# Patient Record
Sex: FEMALE | Race: WHITE | NOT HISPANIC OR LATINO | Employment: UNEMPLOYED | URBAN - METROPOLITAN AREA
[De-identification: names, ages, dates, MRNs, and addresses within clinical notes are randomized per-mention and may not be internally consistent; named-entity substitution may affect disease eponyms.]

---

## 2023-01-10 ENCOUNTER — OFFICE VISIT (OUTPATIENT)
Age: 43
End: 2023-01-10

## 2023-01-10 VITALS
DIASTOLIC BLOOD PRESSURE: 88 MMHG | HEART RATE: 88 BPM | BODY MASS INDEX: 29.23 KG/M2 | WEIGHT: 165 LBS | HEIGHT: 63 IN | TEMPERATURE: 97 F | RESPIRATION RATE: 19 BRPM | OXYGEN SATURATION: 99 % | SYSTOLIC BLOOD PRESSURE: 129 MMHG

## 2023-01-10 DIAGNOSIS — L03.011 PARONYCHIA OF RIGHT MIDDLE FINGER: Primary | ICD-10-CM

## 2023-01-10 RX ORDER — AMOXICILLIN AND CLAVULANATE POTASSIUM 875; 125 MG/1; MG/1
1 TABLET, FILM COATED ORAL EVERY 12 HOURS SCHEDULED
Qty: 20 TABLET | Refills: 0 | Status: SHIPPED | OUTPATIENT
Start: 2023-01-10 | End: 2023-01-20

## 2023-01-10 NOTE — PROGRESS NOTES
[facility]        NAME: Dianna Feliciano is a 40 y o  female  : 1980    MRN: [id number]  DATE: 2023  TIME: 9:51 AM    Assessment and Plan   Paronychia of right middle finger [L03 011]  1  Paronychia of right middle finger  amoxicillin-clavulanate (AUGMENTIN) 875-125 mg per tablet    Wound culture and Gram stain    Incision and drain    sulfamethoxazole-trimethoprim (BACTRIM DS) 800-160 mg per tablet            Patient Instructions       Follow up with PCP  Finish antibiotic  Warm soaks  I explained to her that we took a culture and if there is any different antibiotic needed we will let her know  Otherwise keep the wound covered  Culture came back and some bacteria were sensitive to the Augmentin and others were resistant  The bacteria was not sensitive to just 1 antibiotic  Discussed with patient and Bactrim added  Chief Complaint     Chief Complaint   Patient presents with   • Finger Swelling     Pt reports R third digit swelling and redness for the past week  Denies any trauma to digit, states this seems to happen often to her finger nails as it has happened in the past with 2 other nails  History of Present Illness       Patient works on the farm and milks cows  Has had difficulty with infections in her hands in the past   Over the last week she has had increasing right long finger swelling redness and throbbing pain  No drainage  She did try to open it with a needle but was unsuccessful  No other complaints  Review of Systems   Review of Systems   All other systems reviewed and are negative          Current Medications       Current Outpatient Medications:   •  amoxicillin-clavulanate (AUGMENTIN) 875-125 mg per tablet, Take 1 tablet by mouth every 12 (twelve) hours for 10 days, Disp: 20 tablet, Rfl: 0  •  sulfamethoxazole-trimethoprim (BACTRIM DS) 800-160 mg per tablet, Take 1 tablet by mouth every 12 (twelve) hours for 10 days, Disp: 20 tablet, Rfl: 0    Current Allergies     Allergies as of 01/10/2023   • (No Known Allergies)            The following portions of the patient's history were reviewed and updated as appropriate: allergies, current medications, past family history, past medical history, past social history, past surgical history and problem list      Past Medical History:   Diagnosis Date   • Rheumatoid arthritis ([address] )        Past Surgical History:   Procedure Laterality Date   • APPENDECTOMY     •  SECTION         History reviewed  No pertinent family history  Medications have been verified  Objective   /88   Pulse 88   Temp (!) 97 °F (36 1 °C)   Resp 19   Ht 5' 3" (1 6 m)   Wt 74 8 kg (165 lb)   LMP 2023   SpO2 99%   BMI 29 23 kg/m²   Patient's last menstrual period was 2023  Physical Exam     Physical Exam  Vitals and nursing note reviewed  Constitutional:       Appearance: Normal appearance  She is normal weight  Cardiovascular:      Rate and Rhythm: Normal rate and regular rhythm  Pulses: Normal pulses  Heart sounds: Normal heart sounds  Pulmonary:      Effort: Pulmonary effort is normal       Breath sounds: Normal breath sounds  Neurological:      Mental Status: She is alert  Right middle finger paronychia  Incision and drain    Date/Time: 1/10/2023 11:21 AM  Performed by: Mya Hernandez PA-C  Authorized by: Mya Hernandez PA-C   Universal Protocol:  Consent: Verbal consent obtained  Risks and benefits: risks, benefits and alternatives were discussed  Consent given by: patient  Time out: Immediately prior to procedure a "time out" was called to verify the correct patient, procedure, equipment, support staff and site/side marked as required    Timeout called at: 1/10/2023 11:21 AM   Patient understanding: patient states understanding of the procedure being performed  Patient consent: the patient's understanding of the procedure matches consent given  Patient identity confirmed: verbally with patient      Patient location:  Clinic  Location:     Type:  Abscess    Location:  Upper extremity    Upper extremity location:  R long finger  Pre-procedure details:     Skin preparation:  Antiseptic wash  Anesthesia (see MAR for exact dosages): Anesthesia method:  None  Procedure details:     Complexity:  Simple    Incision types:  Stab incision    Incision depth:  Subcutaneous    Wound management:  Probed and deloculated    Drainage:  Purulent    Drainage amount:  Copious    Wound treatment:  Wound left open    Packing materials:  None  Post-procedure details:     Patient tolerance of procedure:   Tolerated well, no immediate complications

## 2023-01-12 RX ORDER — SULFAMETHOXAZOLE AND TRIMETHOPRIM 800; 160 MG/1; MG/1
1 TABLET ORAL EVERY 12 HOURS SCHEDULED
Qty: 20 TABLET | Refills: 0 | Status: SHIPPED | OUTPATIENT
Start: 2023-01-12 | End: 2023-01-22

## 2023-01-13 LAB
BACTERIA WND AEROBE CULT: ABNORMAL
GRAM STN SPEC: ABNORMAL
GRAM STN SPEC: ABNORMAL

## 2024-05-17 NOTE — PROGRESS NOTES
PT Evaluation     Today's date: 2024  Patient name: Deisi Hanks  : 1980  MRN: [id number]  Referring provider: Turner Hunter MD  Dx:   Encounter Diagnosis     ICD-10-CM    1. Rotator cuff tendinitis, left  M75.82       2. Acute pain of left shoulder  M25.512           Start Time: 0900  Stop Time: 0950  Total time in clinic (min): 50 minutes    Assessment  Impairments: abnormal or restricted ROM, activity intolerance, impaired physical strength, lacks appropriate home exercise program, poor posture  and poor body mechanics    Assessment details: 44 yo female referred to PT with dx of  L shld RTC tendonitis.  Pt presents with 1-5/10 pain scale-with pain mostly L upper arm/superior shld.  Physical exam reveals limited passive L shld IR w/ pain along w/ pain/weakness w/ L shld ER and abd.  Pt exhibits forward head, IR shld w/ mild thoracic kyphsis and +Ordonez and Neer tests on L. Pt demonstrates and/or c/o difficulty with tasks involving lifting w/ outstretched arm or reaching behind the back.  Pt is below her prior functional level due to the above limitations. she would benefit from PT intervention in order to address the above deficits so that she may resume her daily activities at home, work, and in the community safely with less pain and limitation at her premorbid  intensity and duration .     Goals  Pt will achieve the following goals in the next 2-4 weeks  STG 24  Indep with current HEP  Dec pain by 1-3 levels  Improve L shld IR AROM by 5-10 deg  Improve L shld strength by 1/2 grade  Initiate posture, joint conservation and body mechanics education  Consistent PT attendance BIW or as scheduled      Pt will achieve the following goals by d/c (8-12 weeks, or as stated in plan)  LTG  indep and compliant with HEP in order to maximize gains achieved in therapy  0-2/10 pain scale in order to feel better and decrease/eliminate use of  pain &/or anti-inflammatory  meds  L shld/UE  ROM WFL and   strength 4-5/5 in order to improve functional mobility   Exhibit carryover of proper posture, joint conservation techniques, and body mechanics in order to dec unnecessary muscle strain and preserve joint integrity  Improve  FOTO score to 74 or more to indicate inc functional ability of patient   Resume activities,including sleep and tasks involving lifting w/ LUE, safely with less pain and limitation at her premorbid  intensity and duration    Plan  Patient would benefit from: skilled physical therapy  Planned modality interventions: cryotherapy    Planned therapy interventions: neuromuscular re-education, nerve gliding, patient/caregiver education, postural training, body mechanics training, therapeutic activities, stretching, strengthening, therapeutic exercise, flexibility and home exercise program    Frequency: 1-2x week  Duration in weeks: 8  Plan of Care beginning date: 2024  Plan of Care expiration date: 2024  Treatment plan discussed with: patient        Subjective Evaluation    History of Present Illness  Mechanism of injury: 24 Eval-pt c/o B upper shld/cerv-thor soreness x years. But ~ 2 weeks ago began w/ L shld/upper arm pain-worse when inactive/trying to sleep.  Can awaken pt during the night-L s/l not nia well.  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  General upper thoracic/lower cerv soft tissue tightness/tenderness.  Tender B lat scap w/ soft tissue restriction    Active Range of Motion   Left Shoulder   Flexion: 155 degrees with pain  Abduction: 155 degrees with pain  External rotation BTH: C7   Internal rotation BTB: T12 with pain    Right Shoulder   Flexion: 155 degrees   Abduction: 155 degrees   External rotation BTH: C7   Internal rotation BTB: T12     Left Elbow   Normal active range of motion    Right Elbow   Normal active range of motion    Left Wrist   Normal active range of motion    Right Wrist   Normal active range of motion    Passive Range of Motion   Left Shoulder   External rotation 45°: 80 degrees   Internal rotation 45°: 40 degrees with pain    Right Shoulder   External rotation 45°: 80 degrees   Internal rotation 45°: 80 degrees     Strength/Myotome Testing     Left Shoulder     Planes of Motion   Flexion: 4   Abduction: 4 (pain)   External rotation at 0°: 4 (pain)   Internal rotation at 0°: 5     Right Shoulder     Planes of Motion   Flexion: 4   Abduction: 4   External rotation at 0°: 4   Internal rotation at 0°: 5     Left Elbow   Normal strength    Right Elbow   Normal strength    Left Wrist/Hand   Normal wrist strength    Right Wrist/Hand   Normal wrist strength    Tests     Left Shoulder   Positive empty can, Hawkin's and Speed's.   Negative Neer's.     Right Shoulder   Negative empty can, Hawkin's, Neer's and Speed's.       Flowsheet Rows      Flowsheet Row Most Recent Value   PT/OT G-Codes    Current Score 68   Projected Score 74   Assessment Type Evaluation               Precautions: none    SELF PAY/1 TE   5/23/24            Manuals #1     "        PROM/MOB L shld AE                                                   Neuro Re-Ed                                                                                                        Ther Ex             B shld TB  -lat pull down -            L shld TB-6 planes -            Biceps curls  Triceps/paillift -            pendulum -            Scap ret/shrugs X10 ea            Cerv retraction  X10 (against wall)            S/l sleeper stretch (L 3x15\"                                       Ther Activity                                       Gait Training                                       Modalities                                            "

## 2024-05-23 ENCOUNTER — EVALUATION (OUTPATIENT)
Age: 44
End: 2024-05-23

## 2024-05-23 DIAGNOSIS — M25.512 ACUTE PAIN OF LEFT SHOULDER: ICD-10-CM

## 2024-05-23 DIAGNOSIS — M75.82 ROTATOR CUFF TENDINITIS, LEFT: Primary | ICD-10-CM

## 2024-05-23 PROCEDURE — 97161 PT EVAL LOW COMPLEX 20 MIN: CPT

## 2024-05-23 RX ORDER — TOCILIZUMAB 180 MG/ML
INJECTION, SOLUTION SUBCUTANEOUS WEEKLY
COMMUNITY

## 2024-05-23 NOTE — LETTER
May 24, 2024    Turner Hunter MD  [facility].  [place]     Patient: Deisi Hanks   YOB: 1980   Date of Visit: 2024     Encounter Diagnosis     ICD-10-CM    1. Rotator cuff tendinitis, left  M75.82       2. Acute pain of left shoulder  M25.512           Dear Dr. Hunter:    Thank you for your recent referral of Deisi Hanks. Please review the attached evaluation summary from Deisi's recent visit.     Please verify that you agree with the plan of care by signing the attached order.     If you have any questions or concerns, please do not hesitate to call.     I sincerely appreciate the opportunity to share in the care of one of your patients and hope to have another opportunity to work with you in the near future.       Sincerely,    Vee Guillaume, PT      Referring Provider:      I certify that I have read the below Plan of Care and certify the need for these services furnished under this plan of treatment while under my care.                    Turner Hunter MD  [facility].  [place]   Via Fax: [phone]          PT Evaluation     Today's date: 2024  Patient name: Deisi Hanks  : 1980  MRN: [id number]  Referring provider: Turner Hunter MD  Dx:   Encounter Diagnosis     ICD-10-CM    1. Rotator cuff tendinitis, left  M75.82       2. Acute pain of left shoulder  M25.512           Start Time: 0900  Stop Time: 0950  Total time in clinic (min): 50 minutes    Assessment  Impairments: abnormal or restricted ROM, activity intolerance, impaired physical strength, lacks appropriate home exercise program, poor posture  and poor body mechanics    Assessment details: 44 yo female referred to PT with dx of  L shld RTC tendonitis.  Pt presents with 1-5/10 pain scale-with pain mostly L upper arm/superior shld.  Physical exam reveals limited passive L shld IR w/ pain along w/ pain/weakness w/ L shld ER and abd.  Pt exhibits forward head, IR  shld w/ mild thoracic kyphsis and +Ordonez and Neer tests on L. Pt demonstrates and/or c/o difficulty with tasks involving lifting w/ outstretched arm or reaching behind the back.  Pt is below her prior functional level due to the above limitations. she would benefit from PT intervention in order to address the above deficits so that she may resume her daily activities at home, work, and in the community safely with less pain and limitation at her premorbid  intensity and duration .     Goals  Pt will achieve the following goals in the next 2-4 weeks  STG 5/23/24  Indep with current HEP  Dec pain by 1-3 levels  Improve L shld IR AROM by 5-10 deg  Improve L shld strength by 1/2 grade  Initiate posture, joint conservation and body mechanics education  Consistent PT attendance BIW or as scheduled      Pt will achieve the following goals by d/c (8-12 weeks, or as stated in plan)  LTG  indep and compliant with HEP in order to maximize gains achieved in therapy  0-2/10 pain scale in order to feel better and decrease/eliminate use of  pain &/or anti-inflammatory  meds  L shld/UE  ROM WFL and  strength 4-5/5 in order to improve functional mobility   Exhibit carryover of proper posture, joint conservation techniques, and body mechanics in order to dec unnecessary muscle strain and preserve joint integrity  Improve  FOTO score to 74 or more to indicate inc functional ability of patient   Resume activities,including sleep and tasks involving lifting w/ LUE, safely with less pain and limitation at her premorbid  intensity and duration    Plan  Patient would benefit from: skilled physical therapy  Planned modality interventions: cryotherapy    Planned therapy interventions: neuromuscular re-education, nerve gliding, patient/caregiver education, postural training, body mechanics training, therapeutic activities, stretching, strengthening, therapeutic exercise, flexibility and home exercise program    Frequency: 1-2x  week  Duration in weeks: 8  Plan of Care beginning date: 2024  Plan of Care expiration date: 2024  Treatment plan discussed with: patient        Subjective Evaluation    History of Present Illness  Mechanism of injury: 24 Eval-pt c/o B upper shld/cerv-thor soreness x years. But ~ 2 weeks ago began w/ L shld/upper arm pain-worse when inactive/trying to sleep.  Can awaken pt during the night-L s/l not nia well.  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  General upper thoracic/lower cerv soft tissue tightness/tenderness.  Tender B lat scap w/ soft tissue restriction    Active Range of Motion   Left Shoulder   Flexion: 155 degrees with pain  Abduction: 155 degrees with pain  External rotation BTH: C7   Internal rotation BTB: T12 with pain    Right Shoulder   Flexion: 155 degrees   Abduction: 155 degrees   External rotation BTH: C7   Internal rotation BTB: T12     Left Elbow   Normal active range of motion    Right Elbow   Normal active range of motion    Left Wrist   Normal active range of  "motion    Right Wrist   Normal active range of motion    Passive Range of Motion   Left Shoulder   External rotation 45°: 80 degrees   Internal rotation 45°: 40 degrees with pain    Right Shoulder   External rotation 45°: 80 degrees   Internal rotation 45°: 80 degrees     Strength/Myotome Testing     Left Shoulder     Planes of Motion   Flexion: 4   Abduction: 4 (pain)   External rotation at 0°: 4 (pain)   Internal rotation at 0°: 5     Right Shoulder     Planes of Motion   Flexion: 4   Abduction: 4   External rotation at 0°: 4   Internal rotation at 0°: 5     Left Elbow   Normal strength    Right Elbow   Normal strength    Left Wrist/Hand   Normal wrist strength    Right Wrist/Hand   Normal wrist strength    Tests     Left Shoulder   Positive empty can, Hawkin's and Speed's.   Negative Neer's.     Right Shoulder   Negative empty can, Hawkin's, Neer's and Speed's.       Flowsheet Rows      Flowsheet Row Most Recent Value   PT/OT G-Codes    Current Score 68   Projected Score 74   Assessment Type Evaluation               Precautions: none    SELF PAY/1 TE   5/23/24            Manuals #1            PROM/MOB L shld AE                                                   Neuro Re-Ed                                                                                                        Ther Ex             B shld TB  -lat pull down -            L shld TB-6 planes -            Biceps curls  Triceps/paillift -            pendulum -            Scap ret/shrugs X10 ea            Cerv retraction  X10 (against wall)            S/l sleeper stretch (L 3x15\"                                       Ther Activity                                       Gait Training                                       Modalities                                                            "

## 2024-05-23 NOTE — LETTER
May 23, 2024    Turner Hunter MD  [address] [facility].  [place]     Patient: Deisi Hanks   YOB: 1980   Date of Visit: 2024     Encounter Diagnosis     ICD-10-CM    1. Rotator cuff tendinitis, left  M75.82       2. Acute pain of left shoulder  M25.512           Dear Dr. Hunter:    Thank you for your recent referral of Deisi Hanks. Please review the attached evaluation summary from Deisi's recent visit.     Please verify that you agree with the plan of care by signing the attached order.     If you have any questions or concerns, please do not hesitate to call.     I sincerely appreciate the opportunity to share in the care of one of your patients and hope to have another opportunity to work with you in the near future.       Sincerely,    Vee Guillaume, PT      Referring Provider:      I certify that I have read the below Plan of Care and certify the need for these services furnished under this plan of treatment while under my care.                    Turner Hunter MD  [address] [facility].  [place]   Via Fax: [phone]          PT Evaluation     Today's date: 2024  Patient name: Deisi Hanks  : 1980  MRN: [id number]  Referring provider: Turner Hunter MD  Dx:   Encounter Diagnosis     ICD-10-CM    1. Rotator cuff tendinitis, left  M75.82       2. Acute pain of left shoulder  M25.512                      Assessment    Assessment details: 44 yo female referred to PT with dx of  L shld RTC tendonitis.  Pt presents with ***/10 pain scale-with pain mostly  ***.  Physical exam reveals ***.  Pt demonstrates and/or c/o difficulty with ***. Pt is below her prior functional level due to the above limitations. she would benefit from PT intervention in order to address the above deficits so that she may resume her daily activities at home, work, and in the community safely with less pain and limitation at her premorbid  intensity and duration .      Plan  Patient would benefit from: skilled physical therapy  Planned modality interventions: cryotherapy    Planned therapy interventions: neuromuscular re-education, nerve gliding, patient/caregiver education, postural training, body mechanics training, therapeutic activities, stretching, strengthening, therapeutic exercise, flexibility and home exercise program    Frequency: 2x week  Duration in weeks: 8  Plan of Care beginning date: 5/23/2024  Plan of Care expiration date: 7/18/2024  Treatment plan discussed with: patient        Subjective Evaluation    History of Present Illness  Mechanism of injury: 5/23/24 Eval-        Objective           Precautions: ***    SELF PAY/1 TE   5/23/24            Manuals #1            PROM/MOB L shld                                                    Neuro Re-Ed                                                                                                        Ther Ex                                                                                                                     Ther Activity                                       Gait Training                                       Modalities

## 2024-05-24 NOTE — PROGRESS NOTES
"Daily Note     Today's date: 2024  Patient name: Deisi Hanks  : 1980  MRN: [id number]  Referring provider: Turner Hunter MD  Dx:   Encounter Diagnosis     ICD-10-CM    1. Rotator cuff tendinitis, left  M75.82       2. Acute pain of left shoulder  M25.512           Start Time: 0900  Stop Time: 0930  Total time in clinic (min): 30 minutes    Subjective: pt reports less L upper arm pain; still w/ some B cerv/thor soreness      Objective: See treatment diary below      Assessment: Pt tolerated today's treatment session well    Initiated L shld TB PREs (5 planes) and B shld lat pull down (green TB) today during session and patient education provided on HEP and technique .  Pt challenged with some soreness w/ L shld ER (TB), but able to complete. Pt completed exer with VPs for proper form/technique -dhruv sleeper stretch;  reinforced technique and encouraged compliance w/ HEP.   Pt continues to benefit from skilled PT services. Will continue to encourage HEP and PT attendance while addressing pt's  functional deficits & focusing on progression of POC as patient tolerates.       Plan: Continue per plan of care.      Precautions: none    SELF PAY/1 TE   24           Manuals #1 #2           PROM/MOB L shld AE AE                                                  Neuro Re-Ed                                                                                                        Ther Ex             B shld TB  -lat pull down - Green TB x 20           L shld TB-5 planes (f,IR, ER, ab,ad) - grTBx10           Biceps curls  Triceps/paillift - 3# x 20 biceps     *          Stand cane :  -ext  -IR - - *          pendulum -  *          Scap ret/shrugs X10 ea X10 ea           Cerv retraction  X10 (against wall) x10           S/l sleeper stretch (L 3x15\"  3x15\"                                     Ther Activity                                       Gait Training                                       Modalities  "

## 2024-05-29 ENCOUNTER — OFFICE VISIT (OUTPATIENT)
Age: 44
End: 2024-05-29

## 2024-05-29 DIAGNOSIS — M25.512 ACUTE PAIN OF LEFT SHOULDER: ICD-10-CM

## 2024-05-29 DIAGNOSIS — M75.82 ROTATOR CUFF TENDINITIS, LEFT: Primary | ICD-10-CM

## 2024-05-29 PROCEDURE — 97110 THERAPEUTIC EXERCISES: CPT

## 2024-05-30 NOTE — PROGRESS NOTES
"Daily Note     Today's date: 2024  Patient name: Deisi Hanks  : 1980  MRN: [id number]  Referring provider: Turner Hunter MD  Dx:   Encounter Diagnosis     ICD-10-CM    1. Rotator cuff tendinitis, left  M75.82       2. Acute pain of left shoulder  M25.512           Start Time: 0900  Stop Time: 0930  Total time in clinic (min): 30 minutes    Subjective: pt reports L upper arm pain has dissipated.  Still w/ ongoing upper shld/cerv soreness      Objective: See treatment diary below      Assessment: Pt tolerated today's treatment session well    Initiated forward bent row, pendulum and standing cane shld ext and IR today during session and patient education provided on HEP and technique .  Pt challenged with some minor soreness w/ manual L shld IR, but improved w/ intervention and distraction of L GH joint during intervention. Pt completed exer with no adverse reactions  .    Pt continues to benefit from skilled PT services. Will continue to encourage HEP and PT attendance while addressing pt's  functional deficits & focusing on progression of POC as patient tolerates.       Plan: Continue per plan of care.  F/u in ~ 2 weeks for re-eval;  if no further problems, then d/c from PT     Precautions: none    SELF PAY/1 TE   24          Manuals #1 #2 #3          PROM/MOB L shld AE AE AE                                                 Neuro Re-Ed                                                                                                        Ther Ex             B shld TB  -lat pull down - Green TB x 20 Green TB x 20          L shld TB-5 planes (f,IR, ER, ab,ad) - grTBx10 grTBx10          Biceps curls  Triceps/paillift - 3# x 20 biceps 5# x 10 ea              Stand cane :  -ext  -IR - - X10 ea          pendulum -  X10 ea dir          Scap ret/shrugs X10 ea X10 ea X10 ea          Cerv retraction  X10 (against wall) x10 x10          S/l sleeper stretch (L 3x15\"  3x15\" 3x15\"              "                       Ther Activity                                       Gait Training                                       Modalities

## 2024-06-05 ENCOUNTER — OFFICE VISIT (OUTPATIENT)
Age: 44
End: 2024-06-05

## 2024-06-05 DIAGNOSIS — M75.82 ROTATOR CUFF TENDINITIS, LEFT: Primary | ICD-10-CM

## 2024-06-05 DIAGNOSIS — M25.512 ACUTE PAIN OF LEFT SHOULDER: ICD-10-CM

## 2024-06-05 PROCEDURE — 97110 THERAPEUTIC EXERCISES: CPT

## 2024-06-11 NOTE — PROGRESS NOTES
PT Re-Evaluation  and PT Discharge    Today's date: 24  Patient name: Deisi Hanks  : 1980  MRN: [id number]  Referring provider: Turner Hunter MD  Dx:   Encounter Diagnosis     ICD-10-CM    1. Rotator cuff tendinitis, left  M75.82       2. Acute pain of left shoulder  M25.512           Start Time: 905  Stop Time: 935  Total time in clinic (min): 30 minutes    Assessment  Symptom irritability: low    Assessment details: 44 yo female who was referred to PT with dx of  L shld RTC tendonitis.  Pt currently with 0-1/10 pain scale-with pain mostly L upper arm/superior shld.  Physical exam reveals improved L shld IR active and passive w/o pain along w/ inc strength/MMT to 4+/5 in all planes.  Negative Ordonez and Neer tests on L now.  Pt has improved and met her goals.  At this time, she is ready to cont to self manage w/ HEP and d/c from formal PT.  Pt in agreement w/ this plan.    Goals  Pt will achieve the following goals in the next 2-4 weeks  STG 24  Indep with current HEP (met)  Dec pain by 1-3 levels(met)  Improve L shld IR AROM by 5-10 deg (met)  Improve L shld strength by 1/2 grade (met)  Initiate posture, joint conservation and body mechanics education (met)  Consistent PT attendance BIW or as scheduled (met)      Pt will achieve the following goals by d/c (8-12 weeks, or as stated in plan)  LTG  indep and compliant with HEP in order to maximize gains achieved in therapy (met)  0-2/10 pain scale in order to feel better and decrease/eliminate use of  pain &/or anti-inflammatory  meds (met)  L shld/UE  ROM WFL and  strength 4-5/5 in order to improve functional mobility  (met)  Exhibit carryover of proper posture, joint conservation techniques, and body mechanics in order to dec unnecessary muscle strain and preserve joint integrity (met)  Improve  FOTO score to 74 or more to indicate inc functional ability of patient (met)  Resume activities,including sleep and tasks involving lifting w/  LUE, safely with less pain and limitation at her premorbid  intensity and duration (met)    Plan    Planned therapy interventions: home exercise program    Treatment plan discussed with: patient  Plan details: D/c from formal PT        Subjective Evaluation    History of Present Illness  Mechanism of injury: 24 re-eval-not taking any meds . Is sleeping w/o shld pain awakening; able to reach behind back/tie dress.  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 Mild soreness EROM L shld abd    24 Eval-pt c/o B upper shld/cerv-thor soreness x years. But ~ 2 weeks ago began w/ L shld/upper arm pain-worse when inactive/trying to sleep.  Can awaken pt during the night-L s/l not nia well.  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  General upper thoracic/lower cerv soft tissue tightness/tenderness.     Active Range of Motion   Left Shoulder   Flexion: 155 (=) degrees   Abduction: 155 (=) degrees with pain  External rotation BTH: C7 (=)   Internal rotation BTB: T10 (improved)     Right Shoulder   Flexion: 155 degrees    Abduction: 155 degrees   External rotation BTH: C7   Internal rotation BTB: T12     Left Elbow   Normal active range of motion    Right Elbow   Normal active range of motion    Left Wrist   Normal active range of motion    Right Wrist   Normal active range of motion    Passive Range of Motion   Left Shoulder   External rotation 45°: 80 degrees   Internal rotation 45°: 80 (improved) degrees     Right Shoulder   External rotation 45°: 80 degrees   Internal rotation 45°: 80 degrees     Strength/Myotome Testing     Left Shoulder     Planes of Motion   Flexion: 4+   Abduction: 4+ (no pain)   External rotation at 0°: 4+ (no pain)   Internal rotation at 0°: 5 (=)     Right Shoulder     Planes of Motion   Flexion: 4   Abduction: 4   External rotation at 0°: 4   Internal rotation at 0°: 5     Left Elbow   Normal strength    Right Elbow   Normal strength    Left Wrist/Hand   Normal wrist strength    Right Wrist/Hand   Normal wrist strength    Tests     Left Shoulder   Negative empty can, Hawkin's, Neer's and Speed's.     Right Shoulder   Negative empty can, Hawkin's, Neer's and Speed's.       Flowsheet Rows      Flowsheet Row Most Recent Value   PT/OT G-Codes    Current Score 87   Projected Score 74   Assessment Type Discharge                 Precautions: none    SELF PAY/1 TE    ELF PAY/1 TE   5/23/24 5/29/24 6/5/24 6/18/24         Manuals #1 #2 #3 #4         PROM/MOB L shld AE AE AE AE                                                Neuro Re-Ed                                                                                                        Ther Ex             B shld TB  -lat pull down - Green TB x 20 Green TB x 20 Green TB x 20         L shld TB-5 planes (f,IR, ER, ab,ad) - grTBx10 grTBx10 grTBx10         Biceps curls  Triceps/paillift - 3# x 20 biceps 5# x 10 ea     5# x 20 ea         Stand cane :  -ext  -IR - - X10 ea X10 ea         pendulum -  X10 ea dir X10 ea dir         Scap ret/shrugs X10 ea X10 ea X10 ea X10  "ea         Cerv retraction  X10 (against wall) x10 x10 x10         S/l sleeper stretch (L 3x15\"  3x15\" 3x15\" 3x15\"                                   Ther Activity                                       Gait Training                                       Modalities                                              "

## 2024-06-12 ENCOUNTER — APPOINTMENT (OUTPATIENT)
Age: 44
End: 2024-06-12

## 2024-06-18 ENCOUNTER — EVALUATION (OUTPATIENT)
Age: 44
End: 2024-06-18

## 2024-06-18 DIAGNOSIS — M75.82 ROTATOR CUFF TENDINITIS, LEFT: Primary | ICD-10-CM

## 2024-06-18 DIAGNOSIS — M25.512 ACUTE PAIN OF LEFT SHOULDER: ICD-10-CM

## 2024-06-18 PROCEDURE — 97110 THERAPEUTIC EXERCISES: CPT

## 2024-06-18 NOTE — LETTER
2024    Turner Hunter MD  [address] [facility]  [place]     Patient: Deisi Hanks   YOB: 1980   Date of Visit: 2024     Encounter Diagnosis     ICD-10-CM    1. Rotator cuff tendinitis, left  M75.82       2. Acute pain of left shoulder  M25.512           Dear Dr. Hunter:    Thank you for your recent referral of Deisi Hanks. Please review the attached evaluation summary from Deisi's recent visit.     Please verify that you agree with the plan of care by signing the attached order.     If you have any questions or concerns, please do not hesitate to call.     I sincerely appreciate the opportunity to share in the care of one of your patients and hope to have another opportunity to work with you in the near future.       Sincerely,    Vee Guillaume, PT      Referring Provider:      I certify that I have read the below Plan of Care and certify the need for these services furnished under this plan of treatment while under my care.                    Turner Hunter MD  [address] [facility].  [place]   Via Fax: [phone]          PT Re-Evaluation  and PT Discharge    Today's date: 24  Patient name: Deisi Hanks  : 1980  MRN: [id number]  Referring provider: Turner Hunter MD  Dx:   Encounter Diagnosis     ICD-10-CM    1. Rotator cuff tendinitis, left  M75.82       2. Acute pain of left shoulder  M25.512           Start Time: 09  Stop Time: 935  Total time in clinic (min): 30 minutes    Assessment  Symptom irritability: low    Assessment details: 44 yo female who was referred to PT with dx of  L shld RTC tendonitis.  Pt currently with 0-1/10 pain scale-with pain mostly L upper arm/superior shld.  Physical exam reveals improved L shld IR active and passive w/o pain along w/ inc strength/MMT to 4+/5 in all planes.  Negative Ordonez and Neer tests on L now.  Pt has improved and met her goals.  At this time, she is ready to cont to self manage w/  HEP and d/c from formal PT.  Pt in agreement w/ this plan.    Goals  Pt will achieve the following goals in the next 2-4 weeks  STG 5/23/24  Indep with current HEP (met)  Dec pain by 1-3 levels(met)  Improve L shld IR AROM by 5-10 deg (met)  Improve L shld strength by 1/2 grade (met)  Initiate posture, joint conservation and body mechanics education (met)  Consistent PT attendance BIW or as scheduled (met)      Pt will achieve the following goals by d/c (8-12 weeks, or as stated in plan)  LTG  indep and compliant with HEP in order to maximize gains achieved in therapy (met)  0-2/10 pain scale in order to feel better and decrease/eliminate use of  pain &/or anti-inflammatory  meds (met)  L shld/UE  ROM WFL and  strength 4-5/5 in order to improve functional mobility  (met)  Exhibit carryover of proper posture, joint conservation techniques, and body mechanics in order to dec unnecessary muscle strain and preserve joint integrity (met)  Improve  FOTO score to 74 or more to indicate inc functional ability of patient (met)  Resume activities,including sleep and tasks involving lifting w/ LUE, safely with less pain and limitation at her premorbid  intensity and duration (met)    Plan    Planned therapy interventions: home exercise program    Treatment plan discussed with: patient  Plan details: D/c from formal PT        Subjective Evaluation    History of Present Illness  Mechanism of injury: 6/18/24 re-eval-not taking any meds . Is sleeping w/o shld pain awakening; able to reach behind back/tie dress.  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 Mild soreness EROM L shld abd    5/23/24 Eval-pt c/o B upper shld/cerv-thor soreness x years. But ~ 2 weeks ago began w/ L shld/upper arm pain-worse when inactive/trying to sleep.  Can awaken pt during the night-L s/l not nia well.  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  General upper thoracic/lower cerv soft tissue tightness/tenderness.     Active Range of Motion   Left Shoulder   Flexion: 155 (=) degrees   Abduction: 155 (=) degrees with pain  External rotation BTH: C7 (=)   Internal rotation BTB: T10 (improved)     Right Shoulder   Flexion: 155 degrees   Abduction: 155 degrees   External rotation BTH: C7   Internal rotation BTB: T12     Left Elbow   Normal active range of motion    Right Elbow   Normal active range of motion    Left Wrist   Normal active range of motion    Right Wrist   Normal active range of motion    Passive Range of Motion   Left Shoulder   External rotation 45°: 80 degrees   Internal rotation 45°: 80 (improved) degrees     Right Shoulder   External rotation 45°: 80 degrees   Internal rotation 45°: 80 degrees     Strength/Myotome Testing     Left Shoulder     Planes of Motion   Flexion: 4+   Abduction: 4+ (no pain)   External rotation at 0°: 4+ (no pain)   Internal rotation at 0°: 5 (=)     Right Shoulder     Planes of Motion   Flexion: 4   Abduction: 4   External rotation at 0°: 4   Internal rotation at 0°: 5     Left Elbow   Normal strength    Right Elbow  "  Normal strength    Left Wrist/Hand   Normal wrist strength    Right Wrist/Hand   Normal wrist strength    Tests     Left Shoulder   Negative empty can, Hawkin's, Neer's and Speed's.     Right Shoulder   Negative empty can, Hawkin's, Neer's and Speed's.       Flowsheet Rows      Flowsheet Row Most Recent Value   PT/OT G-Codes    Current Score 87   Projected Score 74   Assessment Type Discharge                 Precautions: none    SELF PAY/1 TE    ELF PAY/1 TE   5/23/24 5/29/24 6/5/24 6/18/24         Manuals #1 #2 #3 #4         PROM/MOB L shld AE AE AE AE                                                Neuro Re-Ed                                                                                                        Ther Ex             B shld TB  -lat pull down - Green TB x 20 Green TB x 20 Green TB x 20         L shld TB-5 planes (f,IR, ER, ab,ad) - grTBx10 grTBx10 grTBx10         Biceps curls  Triceps/paillift - 3# x 20 biceps 5# x 10 ea     5# x 20 ea         Stand cane :  -ext  -IR - - X10 ea X10 ea         pendulum -  X10 ea dir X10 ea dir         Scap ret/shrugs X10 ea X10 ea X10 ea X10 ea         Cerv retraction  X10 (against wall) x10 x10 x10         S/l sleeper stretch (L 3x15\"  3x15\" 3x15\" 3x15\"                                   Ther Activity                                       Gait Training                                       Modalities                                                              "

## 2024-11-12 NOTE — PROGRESS NOTES
Patient here for ROUTINE GYN EXAM.    Patient is 44 y.o. year old female G 6 P 4.  She is here today for her routine gyn exam. She is using paraguard for birth control.     Menses every 28 days, duration x 3 days.  Pt does not smoke.  She denies alcohol/drug abuse.  She denies domestic violence/abuse.  She declines STD/HIV testing.  She denies problems with her breasts, bladder, or bowel.      Patient with known uterine fibroid.   Says US done spring 2024. Will request records.   Had some irregular bleedign and was put on OCP for few months which fixed the problem.    Pt with no history of abnormal pap smears.  We discussed the current ACOG pap guidelines. Last pap about 1 year ago    The patients medical, surgical, and family history was reviewed and updated.   domestic violence screen: negative    She has no complaints today.     Last mammogram: none    Family History breast cancer:  no  Family History ovarian cancer: no  Family History colon cancer: no    Current Outpatient Medications on File Prior to Visit   Medication Sig Dispense Refill    methotrexate 7.5 MG tablet Take 7.5 mg by mouth once a week      Tocilizumab (Actemra) 162 MG/0.9ML SOSY Inject under the skin once a week       No current facility-administered medications on file prior to visit.       No Known Allergies    Past Surgical History:   Procedure Laterality Date    APPENDECTOMY       SECTION             Review of Systems   Constitutional: Negative.    HENT: Negative.    Eyes: Negative.    Respiratory: Negative.    Cardiovascular: Negative.    Gastrointestinal: Negative.    Endocrine: Negative.    Genitourinary:        As noted in HPI   Musculoskeletal: Negative.    Skin: Negative.    Allergic/Immunologic: Negative.    Neurological: Negative.    Hematological: Negative.    Psychiatric/Behavioral: Negative      Vitals:    11/15/24 1022   BP: 116/74   BP Location: Left arm   Patient Position: Sitting   Cuff Size: Standard   Weight: 80.8 kg  "(178 lb 3.2 oz)   Height: 5' 3\" (1.6 m)       Chaperone was present during exam.    EXAM:    Neck: supple without nodes or thyromegaly  Heart: regular rate and rhythm  Lungs: clear to auscultation without rales, rhonci  Breasts: nontender, no masses, no discoloration, no discharge  Abdomen: soft, nontender, no masses  Ext genitalia: no lesions, no discoloration  Urethra: no discharge or erythema  Bladder: nontender, good support  Vagina: no discharge, no lesions  Cervix: no lesions, no cervical motion tenderness,  unable to see IUD string but able to feel tip of string  Adnexa: nontender, no masses  Uterus: nontender, normal size and shape      Assessment/ Plan:    Routine GYN exam  Pap  done today.  Paragard IUD for birth control  mammogram ordered.  Record release sent for US/ paps    "

## 2024-11-15 ENCOUNTER — CONSULT (OUTPATIENT)
Age: 44
End: 2024-11-15

## 2024-11-15 VITALS
HEIGHT: 63 IN | DIASTOLIC BLOOD PRESSURE: 74 MMHG | SYSTOLIC BLOOD PRESSURE: 116 MMHG | WEIGHT: 178.2 LBS | BODY MASS INDEX: 31.57 KG/M2

## 2024-11-15 DIAGNOSIS — Z12.4 SCREENING FOR CERVICAL CANCER: ICD-10-CM

## 2024-11-15 DIAGNOSIS — Z01.419 WELL FEMALE EXAM WITH ROUTINE GYNECOLOGICAL EXAM: Primary | ICD-10-CM

## 2024-11-15 DIAGNOSIS — Z12.31 VISIT FOR SCREENING MAMMOGRAM: ICD-10-CM

## 2024-11-15 PROCEDURE — 99386 PREV VISIT NEW AGE 40-64: CPT | Performed by: PHYSICIAN ASSISTANT

## 2024-11-15 PROCEDURE — G0476 HPV COMBO ASSAY CA SCREEN: HCPCS | Performed by: PHYSICIAN ASSISTANT

## 2024-11-15 PROCEDURE — G0145 SCR C/V CYTO,THINLAYER,RESCR: HCPCS | Performed by: PHYSICIAN ASSISTANT

## 2024-11-18 LAB
HPV HR 12 DNA CVX QL NAA+PROBE: NEGATIVE
HPV16 DNA CVX QL NAA+PROBE: NEGATIVE
HPV18 DNA CVX QL NAA+PROBE: NEGATIVE

## 2024-11-19 ENCOUNTER — RESULTS FOLLOW-UP (OUTPATIENT)
Dept: OBGYN CLINIC | Facility: CLINIC | Age: 44
End: 2024-11-19

## 2024-11-20 LAB
LAB AP GYN PRIMARY INTERPRETATION: NORMAL
Lab: NORMAL

## 2024-11-21 ENCOUNTER — TELEPHONE (OUTPATIENT)
Dept: OBGYN CLINIC | Facility: CLINIC | Age: 44
End: 2024-11-21